# Patient Record
Sex: MALE | Race: WHITE | URBAN - METROPOLITAN AREA
[De-identification: names, ages, dates, MRNs, and addresses within clinical notes are randomized per-mention and may not be internally consistent; named-entity substitution may affect disease eponyms.]

---

## 2020-07-01 ENCOUNTER — OFFICE VISIT (OUTPATIENT)
Dept: PRIMARY CARE CLINIC | Age: 35
End: 2020-07-01

## 2020-07-01 PROCEDURE — 99211 OFF/OP EST MAY X REQ PHY/QHP: CPT | Performed by: NURSE PRACTITIONER

## 2020-07-01 NOTE — PROGRESS NOTES
Emre Winston received a viral test for COVID-19. They were educated on isolation and quarantine as appropriate. For any symptoms, they were directed to seek care from their PCP, given contact information to establish with a doctor, directed to an urgent care or the emergency room.

## 2020-07-04 LAB
SARS-COV-2: NOT DETECTED
SOURCE: NORMAL

## 2021-10-18 ENCOUNTER — OFFICE VISIT (OUTPATIENT)
Dept: ORTHOPEDIC SURGERY | Age: 36
End: 2021-10-18
Payer: COMMERCIAL

## 2021-10-18 VITALS — WEIGHT: 230 LBS | BODY MASS INDEX: 32.93 KG/M2 | HEIGHT: 70 IN

## 2021-10-18 DIAGNOSIS — R52 PAIN: Primary | ICD-10-CM

## 2021-10-18 DIAGNOSIS — S46.911A ELBOW STRAIN, RIGHT, INITIAL ENCOUNTER: ICD-10-CM

## 2021-10-18 DIAGNOSIS — Z79.01 ANTICOAGULATED: ICD-10-CM

## 2021-10-18 PROCEDURE — 99204 OFFICE O/P NEW MOD 45 MIN: CPT | Performed by: ORTHOPAEDIC SURGERY

## 2021-10-18 NOTE — PROGRESS NOTES
Dr Angella Martin      Date /Time 10/18/2021       8:23 AM EDT  Name Malu Beck             1985   Location  [address] [facility] Rika Guerin  MRN [id number]                Chief Complaint   Patient presents with    Joint Swelling     Right Elbow        History of Present Illness  Malu Beck is a 43 y.o. male who presents with  right pain. Sent in consultation by Eleni oPol MD,. Injury Mechanism: Golf. Worker's Comp. & legal issues:   none. Previous Treatments: Ice, Heat and NSAIDs    Patient presents the office today for a new problem. Patient is here with a chief complaint of right elbow pain. Patient's right elbow became painful approximately 8 days ago when golfing. He does not recall any specific injury or trauma but symptoms progressively got worse. His pain is concentrated over the medial and anterior aspect of the elbow. He does complain of ecchymosis in the region. He does take Coumadin for an aortic valve replacement. His last INR was 2.8. He is due to have it checked soon. Past History  No past medical history on file. No past surgical history on file. Social History     Tobacco Use    Smoking status: Never Smoker   Substance Use Topics    Alcohol use: Not Currently      No current outpatient medications on file prior to visit. No current facility-administered medications on file prior to visit. Review of Systems  10-point ROS is negative other than HPI. Physical Exam  Based off 1997 Exam Criteria  Ht 5' 10\" (1.778 m)   Wt 230 lb (104.3 kg)   BMI 33.00 kg/m²      Constitutional:       General: He is not in acute distress. Appearance: Normal appearance. Cardiovascular:      Rate and Rhythm: Normal rate and regular rhythm. Pulses: Normal pulses. Pulmonary:      Effort: Pulmonary effort is normal. No respiratory distress. Neurological:      Mental Status: He is alert and oriented to person, place, and time. Mental status is at baseline.      Gait: normal    Physical exam of the right elbow demonstrates ecchymosis medial.  Range of motion 10-full flexion. No gross instability either radial or ulnar stresses. Very minimal tenderness over the flexor tendons as it inserts onto the medial epicondyle. Increased pain over the proximal pronator teres to teres and flexor carpi radialis. Imaging    Right elbow: [facility]  Radiographs: X-rays were ordered today and reviewed of the right elbow. 3 views. AP, lateral, and radial head views. They demonstrate no evidence of fractures or dislocations. Assessment and Plan  Salo Knight was seen today for joint swelling. Diagnoses and all orders for this visit:    Pain  -     XR ELBOW RIGHT (MIN 3 VIEWS); Future    Elbow strain, right, initial encounter    Anticoagulated        Patient has a mild elbow strain. This is complicated by his chronic anticoagulation with Coumadin. This did increase the ecchymosis and bleeding at the site of injury. Have recommended rest ice and elevation. Also he should have his INR checked today. If his INR is abnormally high it could explain the increased ecchymosis in the region. Simple observation is all that is necessary at this time. I discussed with Hyacinth Charles that his history, symptoms, signs and imaging are most consistent with elbow strain. We reviewed the natural history of these conditions and treatment options ranging from conservative measures (rest, icing, activity modification, physical therapy, pain meds) to surgical options. In terms of treatment, I recommended continuing with rest, icing, avoidance of painful activities, NSAIDs or pain meds as tolerated, and physical therapy. We discussed surgical options as well, should conservative measures fail. Electronically signed by Maycol Quiñonez MD on 10/18/2021 at 8:23 AM  This dictation was generated by voice recognition computer software.   Although all attempts are made to edit the dictation for accuracy, there may be errors in the transcription that are not intended.